# Patient Record
Sex: FEMALE | Race: WHITE | Employment: UNEMPLOYED | URBAN - METROPOLITAN AREA
[De-identification: names, ages, dates, MRNs, and addresses within clinical notes are randomized per-mention and may not be internally consistent; named-entity substitution may affect disease eponyms.]

---

## 2017-04-25 ENCOUNTER — IP HISTORICAL/CONVERTED ENCOUNTER (OUTPATIENT)
Dept: OTHER | Age: 52
End: 2017-04-25

## 2019-09-24 ENCOUNTER — HOSPITAL ENCOUNTER (OUTPATIENT)
Dept: CT IMAGING | Facility: HOSPITAL | Age: 54
Discharge: HOME | End: 2019-09-24

## 2019-09-24 LAB
CREAT BLD-MCNC: 0.7 MG/DL (ref 0.6–1.4)
GFR SERPLBLD BASED ON 1.73 SQ M-ARVRAT: >60 ML/MIN/{1.73_M2}

## 2021-03-24 ENCOUNTER — OFFICE VISIT (OUTPATIENT)
Dept: GASTROENTEROLOGY | Facility: CLINIC | Age: 56
End: 2021-03-24

## 2021-03-24 ENCOUNTER — PREP FOR SURGERY (OUTPATIENT)
Dept: SURGERY | Facility: SURGERY CENTER | Age: 56
End: 2021-03-24

## 2021-03-24 VITALS
SYSTOLIC BLOOD PRESSURE: 132 MMHG | HEIGHT: 66 IN | TEMPERATURE: 97.5 F | WEIGHT: 145 LBS | BODY MASS INDEX: 23.3 KG/M2 | OXYGEN SATURATION: 89 % | HEART RATE: 79 BPM | DIASTOLIC BLOOD PRESSURE: 90 MMHG

## 2021-03-24 DIAGNOSIS — Z98.0 S/P TOTAL GASTRECTOMY AND ROUX-EN-Y ESOPHAGOJEJUNAL ANASTOMOSIS: ICD-10-CM

## 2021-03-24 DIAGNOSIS — Z90.3 S/P TOTAL GASTRECTOMY AND ROUX-EN-Y ESOPHAGOJEJUNAL ANASTOMOSIS: ICD-10-CM

## 2021-03-24 DIAGNOSIS — Z12.11 ENCOUNTER FOR SCREENING FOR MALIGNANT NEOPLASM OF COLON: ICD-10-CM

## 2021-03-24 DIAGNOSIS — R07.89 ATYPICAL CHEST PAIN: ICD-10-CM

## 2021-03-24 DIAGNOSIS — R11.0 NAUSEA: ICD-10-CM

## 2021-03-24 DIAGNOSIS — R11.0 NAUSEA: Primary | ICD-10-CM

## 2021-03-24 DIAGNOSIS — R07.89 ATYPICAL CHEST PAIN: Primary | ICD-10-CM

## 2021-03-24 PROCEDURE — 99204 OFFICE O/P NEW MOD 45 MIN: CPT | Performed by: INTERNAL MEDICINE

## 2021-03-24 RX ORDER — ASPIRIN 81 MG/1
TABLET, COATED ORAL
COMMUNITY
Start: 2021-02-07

## 2021-03-24 RX ORDER — OMEPRAZOLE 20 MG/1
CAPSULE, DELAYED RELEASE ORAL
COMMUNITY
Start: 2021-03-21 | End: 2021-04-05

## 2021-03-24 RX ORDER — LOSARTAN POTASSIUM 50 MG/1
TABLET ORAL
COMMUNITY
Start: 2021-02-07

## 2021-03-24 RX ORDER — BACLOFEN 10 MG/1
TABLET ORAL
COMMUNITY
Start: 2021-02-07

## 2021-03-24 RX ORDER — POLYETHYLENE GLYCOL 3350 17 G/17G
POWDER, FOR SOLUTION ORAL
COMMUNITY
Start: 2016-10-01

## 2021-03-24 RX ORDER — CHLORHEXIDINE GLUCONATE 0.12 MG/ML
RINSE ORAL
COMMUNITY
Start: 2021-02-10

## 2021-03-24 RX ORDER — GABAPENTIN 300 MG/1
300 CAPSULE ORAL 4 TIMES DAILY
COMMUNITY

## 2021-03-24 RX ORDER — MULTIVIT-MIN/IRON/FOLIC ACID/K 45-800-120
CAPSULE ORAL
COMMUNITY
Start: 2016-10-01

## 2021-03-24 RX ORDER — DULOXETIN HYDROCHLORIDE 30 MG/1
CAPSULE, DELAYED RELEASE ORAL
COMMUNITY
Start: 2021-03-23

## 2021-03-24 RX ORDER — SODIUM CHLORIDE 0.9 % (FLUSH) 0.9 %
10 SYRINGE (ML) INJECTION AS NEEDED
Status: CANCELLED | OUTPATIENT
Start: 2021-03-24

## 2021-03-24 RX ORDER — SODIUM CHLORIDE, SODIUM LACTATE, POTASSIUM CHLORIDE, CALCIUM CHLORIDE 600; 310; 30; 20 MG/100ML; MG/100ML; MG/100ML; MG/100ML
30 INJECTION, SOLUTION INTRAVENOUS CONTINUOUS PRN
Status: CANCELLED | OUTPATIENT
Start: 2021-03-24

## 2021-03-24 RX ORDER — DILTIAZEM HYDROCHLORIDE 180 MG/1
CAPSULE, COATED, EXTENDED RELEASE ORAL
COMMUNITY
Start: 2021-02-07

## 2021-03-24 RX ORDER — SODIUM CHLORIDE 0.9 % (FLUSH) 0.9 %
3 SYRINGE (ML) INJECTION EVERY 12 HOURS SCHEDULED
Status: CANCELLED | OUTPATIENT
Start: 2021-03-24

## 2021-03-24 RX ORDER — FAMOTIDINE 10 MG
10 TABLET ORAL 2 TIMES DAILY
COMMUNITY

## 2021-03-24 RX ORDER — PROCHLORPERAZINE MALEATE 5 MG/1
TABLET ORAL
COMMUNITY
Start: 2021-02-07

## 2021-03-24 RX ORDER — PRAVASTATIN SODIUM 20 MG
TABLET ORAL
COMMUNITY
Start: 2021-02-07

## 2021-03-24 NOTE — PROGRESS NOTES
"No chief complaint on file.          History of Present Illness  55-year-old female presents today for abdominal pain, nausea, atypical chest pain. She has had total gastrectomy 5 years ago for complications secondary to gastroparesis.     She has nausea immediately after eating and will have pain after eating as well. She is eating smaller meals and trying smaller pureed meals but has pain despite diet changes.      She has constant pain in her chest and she feels that things are backing up.    She went to the ER and was told that she had a hiatal hernia and negative cardiac evaluation.      She is diabetic and reports that her blood sugars are elevated.     She is on MiraLAX to help empty as symptoms are worse if she is constipated.     She is status post cholecystectomy and appendectomy. She has a history of colon polyps. She has not had colonoscopy since her gastric surgery in 2016.    Review of Systems   Neurological: Positive for numbness.        Result Review :           Vital Signs:   /90   Pulse 79   Temp 97.5 °F (36.4 °C)   Ht 167.6 cm (66\")   Wt 65.8 kg (145 lb)   SpO2 (!) 89%   BMI 23.40 kg/m²     Body mass index is 23.4 kg/m².     Physical Exam  Vitals reviewed.   Constitutional:       Appearance: Normal appearance.   HENT:      Nose: No nasal deformity.   Eyes:      General: No scleral icterus.  Neck:      Comments: Trachea midline.  Cardiovascular:      Rate and Rhythm: Normal rate and regular rhythm.   Pulmonary:      Effort: No respiratory distress.      Breath sounds: Normal breath sounds.   Abdominal:      General: Bowel sounds are normal. There is no distension.      Palpations: Abdomen is soft. There is no mass.      Tenderness: There is no abdominal tenderness.   Lymphadenopathy:      Comments: No periumbilical lymphadenopathy.   Skin:     General: Skin is warm.   Neurological:      Mental Status: She is alert.           Assessment and Plan    Diagnoses and all orders for this " visit:    1. Nausea (Primary)  -     FL Upper GI With Air Contrast; Future    2. Atypical chest pain  -     FL Upper GI With Air Contrast; Future    3. S/P total gastrectomy and Milana-en-Y esophagojejunal anastomosis  -     FL Upper GI With Air Contrast; Future       Presume component of small bowel bacterial overgrowth. Will evaluate surgical changes with SBFT and based on this result, to consider EGD and colonoscopy. Discussed limitations from surgical changes that cannot be undone.     Offered treatment for SBBO vs testing, she would like to treat first and proceed SBFT.    Documentation completed by Tyra VICK acting as a scribe and the following sections (HPI, assessment, plan) for the undersigned provider. Abbie HAUSER have reviewed and confirmed the accuracy of the HPI and Assessment and Plan as documented by the APRN NAVA Hollis        Follow Up   No follow-ups on file.    There are no Patient Instructions on file for this visit.

## 2021-03-25 ENCOUNTER — TRANSCRIBE ORDERS (OUTPATIENT)
Dept: LAB | Facility: SURGERY CENTER | Age: 56
End: 2021-03-25

## 2021-03-25 DIAGNOSIS — Z01.818 OTHER SPECIFIED PRE-OPERATIVE EXAMINATION: Primary | ICD-10-CM

## 2021-03-25 PROBLEM — R07.89 ATYPICAL CHEST PAIN: Status: ACTIVE | Noted: 2021-03-25

## 2021-03-25 PROBLEM — R11.0 NAUSEA: Status: ACTIVE | Noted: 2021-03-25

## 2021-03-25 PROBLEM — Z90.3 S/P TOTAL GASTRECTOMY AND ROUX-EN-Y ESOPHAGOJEJUNAL ANASTOMOSIS: Status: ACTIVE | Noted: 2021-03-25

## 2021-03-25 PROBLEM — Z98.0 S/P TOTAL GASTRECTOMY AND ROUX-EN-Y ESOPHAGOJEJUNAL ANASTOMOSIS: Status: ACTIVE | Noted: 2021-03-25

## 2021-03-25 PROBLEM — Z12.11 ENCOUNTER FOR SCREENING FOR MALIGNANT NEOPLASM OF COLON: Status: ACTIVE | Noted: 2021-03-25

## 2021-03-29 ENCOUNTER — TRANSCRIBE ORDERS (OUTPATIENT)
Dept: LAB | Facility: HOSPITAL | Age: 56
End: 2021-03-29

## 2021-03-29 DIAGNOSIS — Z01.818 OTHER SPECIFIED PRE-OPERATIVE EXAMINATION: Primary | ICD-10-CM

## 2021-03-31 ENCOUNTER — LAB (OUTPATIENT)
Dept: LAB | Facility: HOSPITAL | Age: 56
End: 2021-03-31

## 2021-03-31 DIAGNOSIS — Z01.818 OTHER SPECIFIED PRE-OPERATIVE EXAMINATION: ICD-10-CM

## 2021-03-31 PROCEDURE — U0004 COV-19 TEST NON-CDC HGH THRU: HCPCS

## 2021-03-31 PROCEDURE — C9803 HOPD COVID-19 SPEC COLLECT: HCPCS

## 2021-04-01 LAB — SARS-COV-2 RNA RESP QL NAA+PROBE: NOT DETECTED

## 2021-04-02 ENCOUNTER — HOSPITAL ENCOUNTER (OUTPATIENT)
Dept: GENERAL RADIOLOGY | Facility: HOSPITAL | Age: 56
Discharge: HOME | End: 2021-04-02
Admitting: INTERNAL MEDICINE

## 2021-04-02 ENCOUNTER — TELEPHONE (OUTPATIENT)
Dept: GASTROENTEROLOGY | Facility: CLINIC | Age: 56
End: 2021-04-02

## 2021-04-02 DIAGNOSIS — Z98.0 S/P TOTAL GASTRECTOMY AND ROUX-EN-Y ESOPHAGOJEJUNAL ANASTOMOSIS: ICD-10-CM

## 2021-04-02 DIAGNOSIS — R07.89 ATYPICAL CHEST PAIN: ICD-10-CM

## 2021-04-02 DIAGNOSIS — Z90.3 S/P TOTAL GASTRECTOMY AND ROUX-EN-Y ESOPHAGOJEJUNAL ANASTOMOSIS: ICD-10-CM

## 2021-04-02 DIAGNOSIS — R11.0 NAUSEA: ICD-10-CM

## 2021-04-02 PROCEDURE — 74246 X-RAY XM UPR GI TRC 2CNTRST: CPT

## 2021-04-02 RX ORDER — METRONIDAZOLE 250 MG/1
250 TABLET ORAL 3 TIMES DAILY
Qty: 30 TABLET | Refills: 0 | Status: ON HOLD | OUTPATIENT
Start: 2021-04-02 | End: 2021-05-11

## 2021-04-02 RX ADMIN — ANTACID/ANTIFLATULENT 1 TABLET: 380; 550; 10; 10 GRANULE, EFFERVESCENT ORAL at 10:04

## 2021-04-02 RX ADMIN — BARIUM SULFATE 135 ML: 980 POWDER, FOR SUSPENSION ORAL at 10:04

## 2021-04-02 NOTE — TELEPHONE ENCOUNTER
Patient's Xifaxan has been denied by insurance. Denial letter is scanned into chart DOS 03/26/2021. Is there an alternative medication for this patient?     I will be leaving the office shortly, do you mind sending the response to the pool? I told patient that we would call her at some point today.       Thanks,  Xavier

## 2021-04-02 NOTE — TELEPHONE ENCOUNTER
I sent through a prescription for metronidazole to replace the Xifaxan.  Patient cannot drink any alcohol while taking the metronidazole.

## 2021-04-05 ENCOUNTER — PREP FOR SURGERY (OUTPATIENT)
Dept: SURGERY | Facility: SURGERY CENTER | Age: 56
End: 2021-04-05

## 2021-04-05 DIAGNOSIS — Z90.3 S/P TOTAL GASTRECTOMY AND ROUX-EN-Y ESOPHAGOJEJUNAL ANASTOMOSIS: Primary | ICD-10-CM

## 2021-04-05 DIAGNOSIS — R07.89 ATYPICAL CHEST PAIN: ICD-10-CM

## 2021-04-05 DIAGNOSIS — R11.0 NAUSEA: ICD-10-CM

## 2021-04-05 DIAGNOSIS — Z98.0 S/P TOTAL GASTRECTOMY AND ROUX-EN-Y ESOPHAGOJEJUNAL ANASTOMOSIS: Primary | ICD-10-CM

## 2021-04-05 RX ORDER — PANTOPRAZOLE SODIUM 40 MG/1
40 TABLET, DELAYED RELEASE ORAL 2 TIMES DAILY
Qty: 60 TABLET | Refills: 3 | Status: SHIPPED | OUTPATIENT
Start: 2021-04-05

## 2021-05-08 ENCOUNTER — LAB (OUTPATIENT)
Dept: LAB | Facility: SURGERY CENTER | Age: 56
End: 2021-05-08

## 2021-05-08 DIAGNOSIS — Z01.818 OTHER SPECIFIED PRE-OPERATIVE EXAMINATION: ICD-10-CM

## 2021-05-08 LAB — SARS-COV-2 ORF1AB RESP QL NAA+PROBE: NOT DETECTED

## 2021-05-08 PROCEDURE — U0004 COV-19 TEST NON-CDC HGH THRU: HCPCS | Performed by: SURGERY

## 2021-05-08 PROCEDURE — C9803 HOPD COVID-19 SPEC COLLECT: HCPCS

## 2021-05-11 ENCOUNTER — ANESTHESIA EVENT (OUTPATIENT)
Dept: SURGERY | Facility: SURGERY CENTER | Age: 56
End: 2021-05-11

## 2021-05-11 ENCOUNTER — HOSPITAL ENCOUNTER (OUTPATIENT)
Facility: SURGERY CENTER | Age: 56
Setting detail: HOSPITAL OUTPATIENT SURGERY
Discharge: HOME | End: 2021-05-11
Attending: INTERNAL MEDICINE | Admitting: INTERNAL MEDICINE

## 2021-05-11 ENCOUNTER — ANESTHESIA (OUTPATIENT)
Dept: SURGERY | Facility: SURGERY CENTER | Age: 56
End: 2021-05-11

## 2021-05-11 VITALS
SYSTOLIC BLOOD PRESSURE: 128 MMHG | BODY MASS INDEX: 22.82 KG/M2 | OXYGEN SATURATION: 92 % | HEIGHT: 66 IN | RESPIRATION RATE: 16 BRPM | DIASTOLIC BLOOD PRESSURE: 82 MMHG | HEART RATE: 66 BPM | TEMPERATURE: 97.3 F | WEIGHT: 142 LBS

## 2021-05-11 DIAGNOSIS — Z98.0 S/P TOTAL GASTRECTOMY AND ROUX-EN-Y ESOPHAGOJEJUNAL ANASTOMOSIS: ICD-10-CM

## 2021-05-11 DIAGNOSIS — R11.0 NAUSEA: ICD-10-CM

## 2021-05-11 DIAGNOSIS — Z12.11 ENCOUNTER FOR SCREENING FOR MALIGNANT NEOPLASM OF COLON: ICD-10-CM

## 2021-05-11 DIAGNOSIS — R07.89 ATYPICAL CHEST PAIN: ICD-10-CM

## 2021-05-11 DIAGNOSIS — Z90.3 S/P TOTAL GASTRECTOMY AND ROUX-EN-Y ESOPHAGOJEJUNAL ANASTOMOSIS: ICD-10-CM

## 2021-05-11 LAB — GLUCOSE BLDC GLUCOMTR-MCNC: 112 MG/DL (ref 70–130)

## 2021-05-11 PROCEDURE — 25010000002 PROPOFOL 10 MG/ML EMULSION: Performed by: ANESTHESIOLOGY

## 2021-05-11 PROCEDURE — 0DBP8ZX EXCISION OF RECTUM, VIA NATURAL OR ARTIFICIAL OPENING ENDOSCOPIC, DIAGNOSTIC: ICD-10-PCS | Performed by: INTERNAL MEDICINE

## 2021-05-11 PROCEDURE — 43239 EGD BIOPSY SINGLE/MULTIPLE: CPT | Performed by: INTERNAL MEDICINE

## 2021-05-11 PROCEDURE — 0DB48ZX EXCISION OF ESOPHAGOGASTRIC JUNCTION, VIA NATURAL OR ARTIFICIAL OPENING ENDOSCOPIC, DIAGNOSTIC: ICD-10-PCS | Performed by: INTERNAL MEDICINE

## 2021-05-11 PROCEDURE — 88305 TISSUE EXAM BY PATHOLOGIST: CPT | Performed by: INTERNAL MEDICINE

## 2021-05-11 PROCEDURE — 45380 COLONOSCOPY AND BIOPSY: CPT | Performed by: INTERNAL MEDICINE

## 2021-05-11 PROCEDURE — 25010000002 ONDANSETRON PER 1 MG: Performed by: ANESTHESIOLOGY

## 2021-05-11 RX ORDER — ONDANSETRON 2 MG/ML
4 INJECTION INTRAMUSCULAR; INTRAVENOUS ONCE AS NEEDED
Status: COMPLETED | OUTPATIENT
Start: 2021-05-11 | End: 2021-05-11

## 2021-05-11 RX ORDER — LIDOCAINE HYDROCHLORIDE 20 MG/ML
INJECTION, SOLUTION INFILTRATION; PERINEURAL AS NEEDED
Status: DISCONTINUED | OUTPATIENT
Start: 2021-05-11 | End: 2021-05-11 | Stop reason: SURG

## 2021-05-11 RX ORDER — LABETALOL HYDROCHLORIDE 5 MG/ML
5 INJECTION, SOLUTION INTRAVENOUS
Status: DISCONTINUED | OUTPATIENT
Start: 2021-05-11 | End: 2021-05-11 | Stop reason: HOSPADM

## 2021-05-11 RX ORDER — SODIUM CHLORIDE, SODIUM LACTATE, POTASSIUM CHLORIDE, CALCIUM CHLORIDE 600; 310; 30; 20 MG/100ML; MG/100ML; MG/100ML; MG/100ML
30 INJECTION, SOLUTION INTRAVENOUS CONTINUOUS PRN
Status: DISCONTINUED | OUTPATIENT
Start: 2021-05-11 | End: 2021-05-11 | Stop reason: HOSPADM

## 2021-05-11 RX ORDER — HYDRALAZINE HYDROCHLORIDE 20 MG/ML
10 INJECTION INTRAMUSCULAR; INTRAVENOUS
Status: DISCONTINUED | OUTPATIENT
Start: 2021-05-11 | End: 2021-05-11 | Stop reason: HOSPADM

## 2021-05-11 RX ORDER — PROPOFOL 10 MG/ML
VIAL (ML) INTRAVENOUS AS NEEDED
Status: DISCONTINUED | OUTPATIENT
Start: 2021-05-11 | End: 2021-05-11 | Stop reason: SURG

## 2021-05-11 RX ORDER — FENTANYL CITRATE 50 UG/ML
25 INJECTION, SOLUTION INTRAMUSCULAR; INTRAVENOUS
Status: DISCONTINUED | OUTPATIENT
Start: 2021-05-11 | End: 2021-05-11 | Stop reason: HOSPADM

## 2021-05-11 RX ORDER — MAGNESIUM HYDROXIDE 1200 MG/15ML
LIQUID ORAL AS NEEDED
Status: DISCONTINUED | OUTPATIENT
Start: 2021-05-11 | End: 2021-05-11 | Stop reason: HOSPADM

## 2021-05-11 RX ADMIN — PROPOFOL 200 MCG/KG/MIN: 10 INJECTION, EMULSION INTRAVENOUS at 07:30

## 2021-05-11 RX ADMIN — PROPOFOL 140 MG: 10 INJECTION, EMULSION INTRAVENOUS at 07:30

## 2021-05-11 RX ADMIN — ONDANSETRON 4 MG: 2 INJECTION INTRAMUSCULAR; INTRAVENOUS at 08:23

## 2021-05-11 RX ADMIN — LIDOCAINE HYDROCHLORIDE 60 MG: 20 INJECTION, SOLUTION INFILTRATION; PERINEURAL at 07:30

## 2021-05-11 RX ADMIN — SODIUM CHLORIDE, POTASSIUM CHLORIDE, SODIUM LACTATE AND CALCIUM CHLORIDE 30 ML/HR: 600; 310; 30; 20 INJECTION, SOLUTION INTRAVENOUS at 06:24

## 2021-05-11 NOTE — ANESTHESIA PREPROCEDURE EVALUATION
Anesthesia Evaluation     no history of anesthetic complications:  NPO Solid Status: > 8 hours  NPO Liquid Status: > 2 hours           Airway   Mallampati: I  Neck ROM: full  no difficulty expected  Dental    (+) edentulous    Pulmonary - normal exam   (+) sleep apnea,   (-) COPD, asthma, not a smoker    PE comment: nonlabored  Cardiovascular - normal exam    Rhythm: regular  Rate: normal    (+) hypertension,   (-) valvular problems/murmurs, past MI, CAD, dysrhythmias, angina      Neuro/Psych  (+) headaches, numbness (polyneuropathy),     (-) seizures, TIA, CVA  GI/Hepatic/Renal/Endo    (+)  GERD,  diabetes mellitus type 2,   (-) liver disease, no renal disease, no thyroid disorder    ROS Comment: S/p Gastrectomy & marcio-en-Y;  Nausea;  IBS    Musculoskeletal (-) negative ROS    Abdominal    Substance History      OB/GYN          Other          Other Comment: Lupus anticoagulant syndrome                  Anesthesia Plan    ASA 3     MAC       Anesthetic plan, all risks, benefits, and alternatives have been provided, discussed and informed consent has been obtained with: patient.

## 2021-05-11 NOTE — ANESTHESIA POSTPROCEDURE EVALUATION
"Patient: Rhianna Anderson    Procedure Summary     Date: 05/11/21 Room / Location: [facility]  / [facility]    Anesthesia Start: 0725 Anesthesia Stop: 0756    Procedures:       ESOPHAGOGASTRODUODENOSCOPY (N/A )      COLONOSCOPY FOR SCREENING (N/A ) Diagnosis:       Atypical chest pain      S/P total gastrectomy and Milana-en-Y esophagojejunal anastomosis      Nausea      Encounter for screening for malignant neoplasm of colon      (Atypical chest pain [R07.89])      (S/P total gastrectomy and Milana-en-Y esophagojejunal anastomosis [Z90.3, Z98.0])      (Nausea [R11.0])      (Encounter for screening for malignant neoplasm of colon [Z12.11])    Surgeons: Ricki Pagan MD Provider: Jose G Velasquez MD    Anesthesia Type: MAC ASA Status: 3          Anesthesia Type: MAC    Vitals  No vitals data found for the desired time range.          Post Anesthesia Care and Evaluation    Patient location during evaluation: bedside  Patient participation: complete - patient participated  Level of consciousness: sleepy but conscious  Pain management: adequate  Airway patency: patent  Anesthetic complications: No anesthetic complications    Cardiovascular status: acceptable  Respiratory status: acceptable  Hydration status: acceptable    Comments: */93 (BP Location: Left arm, Patient Position: Sitting)   Pulse 68   Temp 36.4 °C (97.5 °F) (Tympanic)   Resp 16   Ht 167.6 cm (66\")   Wt 64.4 kg (142 lb)   SpO2 100%   BMI 22.92 kg/m²         "

## 2021-05-12 DIAGNOSIS — R93.3 ABNORMAL COLONOSCOPY: Primary | ICD-10-CM

## 2021-05-12 DIAGNOSIS — K57.90 DIVERTICULOSIS: ICD-10-CM

## 2021-05-12 LAB
LAB AP CASE REPORT: NORMAL
LAB AP CLINICAL INFORMATION: NORMAL
PATH REPORT.FINAL DX SPEC: NORMAL
PATH REPORT.GROSS SPEC: NORMAL

## 2021-05-21 ENCOUNTER — TELEPHONE (OUTPATIENT)
Dept: GASTROENTEROLOGY | Facility: CLINIC | Age: 56
End: 2021-05-21

## 2021-05-25 ENCOUNTER — HOSPITAL ENCOUNTER (OUTPATIENT)
Dept: CT IMAGING | Facility: HOSPITAL | Age: 56
Discharge: HOME | End: 2021-05-25

## 2021-05-25 ENCOUNTER — TELEPHONE (OUTPATIENT)
Dept: GASTROENTEROLOGY | Facility: CLINIC | Age: 56
End: 2021-05-25

## 2021-05-25 DIAGNOSIS — R07.89 ATYPICAL CHEST PAIN: Primary | ICD-10-CM

## 2021-05-25 RX ORDER — SUCRALFATE ORAL 1 G/10ML
SUSPENSION ORAL
Qty: 1200 ML | Refills: 1 | Status: SHIPPED | OUTPATIENT
Start: 2021-05-25

## 2021-05-25 NOTE — TELEPHONE ENCOUNTER
Patient reports she is still having problems with constant daily chest pain.  Pantoprazole 40mg BID is helping with the acid reflux but still has choking sensations and regurgitation when she eats or drinks.  She is limited in the amount she can eat or drink.  She has a hernia and is also questioning why she was told she has chronic inflammation in the gastric mucosa.  She said we were supposed to give her a nutrition regimen also.    Please discuss with me.    Discussed with Dr Pagan.  Per the path report:  Chronic inflammation in the gastric mucosa. (Per the pathologist)  Dr Pagan took the biopsy between the area of the esophagus and the small intestine. There may be some gastric cells in that area as she has no stomach which Dr Pagan is aware of.    Dr Pagan understands she must be refluxing terribly.  She needs to take liquid carafate or crush the carafate tablets and take with one table spoon of water BID to TID.  Patient needs follow-up appt to discuss possibly starting on Reglan. We can get her in quickly with one of our NP's.    [facility]   Discussed recommendations with patient and offered to schedule an appt to discuss treatment with Reglan.  Informed patient we will address the hernia when she comes in.    She said she will discuss with her PCM and call back to schedule an appt with Dr Pagan.  betty

## (undated) DEVICE — FLEX ADVANTAGE 1500CC: Brand: FLEX ADVANTAGE

## (undated) DEVICE — KT ORCA ORCAPOD DISP STRL

## (undated) DEVICE — SINGLE-USE BIOPSY FORCEPS: Brand: RADIAL JAW 4

## (undated) DEVICE — VIAL FORMLN CAP 10PCT 20ML

## (undated) DEVICE — GOWN PROC ENDOARMOR GI LVL3 HY/SHLD UNIV

## (undated) DEVICE — BITEBLOCK OMNI BLOC

## (undated) DEVICE — Device

## (undated) DEVICE — MSK ENDO PORT O2 POM ELITE CURAPLEX A/

## (undated) DEVICE — CANN NASL CO2 TRULINK W/O2 A/